# Patient Record
Sex: FEMALE | Race: WHITE | Employment: FULL TIME | URBAN - METROPOLITAN AREA
[De-identification: names, ages, dates, MRNs, and addresses within clinical notes are randomized per-mention and may not be internally consistent; named-entity substitution may affect disease eponyms.]

---

## 2018-06-18 LAB
ABO/RH: NORMAL
ANTIBODY SCREEN: NEGATIVE
HEPATITIS B SURFACE ANTIGEN INTERPRETATION: NEGATIVE
HIV-1 AND HIV-2 ANTIBODIES: NEGATIVE
RPR: NORMAL
RUBELLA ANTIBODY IGG: NORMAL

## 2019-01-30 ENCOUNTER — ANESTHESIA (OUTPATIENT)
Dept: LABOR AND DELIVERY | Age: 34
End: 2019-01-30
Payer: COMMERCIAL

## 2019-01-30 ENCOUNTER — HOSPITAL ENCOUNTER (INPATIENT)
Age: 34
LOS: 2 days | Discharge: HOME | End: 2019-02-01
Attending: OBSTETRICS & GYNECOLOGY | Admitting: OBSTETRICS & GYNECOLOGY
Payer: COMMERCIAL

## 2019-01-30 ENCOUNTER — ANESTHESIA EVENT (OUTPATIENT)
Dept: LABOR AND DELIVERY | Age: 34
End: 2019-01-30
Payer: COMMERCIAL

## 2019-01-30 PROBLEM — Z37.9 NORMAL LABOR: Status: ACTIVE | Noted: 2019-01-30

## 2019-01-30 LAB
ABO/RH: NORMAL
AMPHETAMINE SCREEN, URINE: NORMAL
ANTIBODY SCREEN: NORMAL
BARBITURATE SCREEN URINE: NORMAL
BENZODIAZEPINE SCREEN, URINE: NORMAL
BUPRENORPHINE URINE: NORMAL
CANNABINOID SCREEN URINE: NORMAL
COCAINE METABOLITE SCREEN URINE: NORMAL
HCT VFR BLD CALC: 42.2 % (ref 36–48)
HEMOGLOBIN: 14.4 G/DL (ref 12–16)
Lab: NORMAL
MCH RBC QN AUTO: 32.3 PG (ref 26–34)
MCHC RBC AUTO-ENTMCNC: 34 G/DL (ref 31–36)
MCV RBC AUTO: 94.9 FL (ref 80–100)
METHADONE SCREEN, URINE: NORMAL
OPIATE SCREEN URINE: NORMAL
OXYCODONE URINE: NORMAL
PDW BLD-RTO: 12.8 % (ref 12.4–15.4)
PH UA: 6
PHENCYCLIDINE SCREEN URINE: NORMAL
PLATELET # BLD: 218 K/UL (ref 135–450)
PMV BLD AUTO: 8.2 FL (ref 5–10.5)
PROPOXYPHENE SCREEN: NORMAL
RBC # BLD: 4.45 M/UL (ref 4–5.2)
TOTAL SYPHILLIS IGG/IGM: NORMAL
WBC # BLD: 13.6 K/UL (ref 4–11)

## 2019-01-30 PROCEDURE — 86900 BLOOD TYPING SEROLOGIC ABO: CPT

## 2019-01-30 PROCEDURE — 51701 INSERT BLADDER CATHETER: CPT

## 2019-01-30 PROCEDURE — 6360000002 HC RX W HCPCS: Performed by: OBSTETRICS & GYNECOLOGY

## 2019-01-30 PROCEDURE — 1220000000 HC SEMI PRIVATE OB R&B

## 2019-01-30 PROCEDURE — 2500000003 HC RX 250 WO HCPCS: Performed by: NURSE ANESTHETIST, CERTIFIED REGISTERED

## 2019-01-30 PROCEDURE — 2580000003 HC RX 258: Performed by: OBSTETRICS & GYNECOLOGY

## 2019-01-30 PROCEDURE — 85027 COMPLETE CBC AUTOMATED: CPT

## 2019-01-30 PROCEDURE — 86901 BLOOD TYPING SEROLOGIC RH(D): CPT

## 2019-01-30 PROCEDURE — 80307 DRUG TEST PRSMV CHEM ANLYZR: CPT

## 2019-01-30 PROCEDURE — 86780 TREPONEMA PALLIDUM: CPT

## 2019-01-30 PROCEDURE — 3700000025 ANESTHESIA EPIDURAL BLOCK: Performed by: ANESTHESIOLOGY

## 2019-01-30 PROCEDURE — 86850 RBC ANTIBODY SCREEN: CPT

## 2019-01-30 RX ORDER — BUPIVACAINE HYDROCHLORIDE 2.5 MG/ML
INJECTION, SOLUTION EPIDURAL; INFILTRATION; INTRACAUDAL PRN
Status: DISCONTINUED | OUTPATIENT
Start: 2019-01-30 | End: 2019-01-30 | Stop reason: SDUPTHER

## 2019-01-30 RX ORDER — SODIUM CHLORIDE, SODIUM LACTATE, POTASSIUM CHLORIDE, CALCIUM CHLORIDE 600; 310; 30; 20 MG/100ML; MG/100ML; MG/100ML; MG/100ML
INJECTION, SOLUTION INTRAVENOUS CONTINUOUS
Status: DISCONTINUED | OUTPATIENT
Start: 2019-01-30 | End: 2019-01-31 | Stop reason: SDUPTHER

## 2019-01-30 RX ORDER — BUPIVACAINE HYDROCHLORIDE 5 MG/ML
INJECTION, SOLUTION EPIDURAL; INTRACAUDAL PRN
Status: DISCONTINUED | OUTPATIENT
Start: 2019-01-30 | End: 2019-01-30 | Stop reason: SDUPTHER

## 2019-01-30 RX ORDER — SODIUM CHLORIDE 0.9 % (FLUSH) 0.9 %
10 SYRINGE (ML) INJECTION EVERY 12 HOURS SCHEDULED
Status: DISCONTINUED | OUTPATIENT
Start: 2019-01-30 | End: 2019-01-31 | Stop reason: SDUPTHER

## 2019-01-30 RX ORDER — DIPHENHYDRAMINE HCL 25 MG
25 TABLET ORAL EVERY 4 HOURS PRN
Status: DISCONTINUED | OUTPATIENT
Start: 2019-01-30 | End: 2019-01-31 | Stop reason: SDUPTHER

## 2019-01-30 RX ORDER — ACETAMINOPHEN 325 MG/1
650 TABLET ORAL EVERY 4 HOURS PRN
Status: DISCONTINUED | OUTPATIENT
Start: 2019-01-30 | End: 2019-02-01 | Stop reason: HOSPADM

## 2019-01-30 RX ORDER — LIDOCAINE HYDROCHLORIDE AND EPINEPHRINE BITARTRATE 20; .01 MG/ML; MG/ML
INJECTION, SOLUTION SUBCUTANEOUS PRN
Status: DISCONTINUED | OUTPATIENT
Start: 2019-01-30 | End: 2019-01-30 | Stop reason: SDUPTHER

## 2019-01-30 RX ORDER — TERBUTALINE SULFATE 1 MG/ML
0.25 INJECTION, SOLUTION SUBCUTANEOUS ONCE
Status: DISCONTINUED | OUTPATIENT
Start: 2019-01-30 | End: 2019-02-01 | Stop reason: HOSPADM

## 2019-01-30 RX ORDER — ONDANSETRON 2 MG/ML
4 INJECTION INTRAMUSCULAR; INTRAVENOUS EVERY 6 HOURS PRN
Status: DISCONTINUED | OUTPATIENT
Start: 2019-01-30 | End: 2019-01-31 | Stop reason: SDUPTHER

## 2019-01-30 RX ORDER — DIPHENHYDRAMINE HYDROCHLORIDE 50 MG/ML
12.5 INJECTION INTRAMUSCULAR; INTRAVENOUS EVERY 6 HOURS PRN
Status: DISCONTINUED | OUTPATIENT
Start: 2019-01-30 | End: 2019-02-01 | Stop reason: HOSPADM

## 2019-01-30 RX ORDER — ONDANSETRON 2 MG/ML
4 INJECTION INTRAMUSCULAR; INTRAVENOUS EVERY 6 HOURS PRN
Status: DISCONTINUED | OUTPATIENT
Start: 2019-01-30 | End: 2019-02-01 | Stop reason: HOSPADM

## 2019-01-30 RX ORDER — SODIUM CHLORIDE 0.9 % (FLUSH) 0.9 %
10 SYRINGE (ML) INJECTION PRN
Status: DISCONTINUED | OUTPATIENT
Start: 2019-01-30 | End: 2019-01-31 | Stop reason: SDUPTHER

## 2019-01-30 RX ORDER — ACETAMINOPHEN 325 MG/1
650 TABLET ORAL EVERY 4 HOURS PRN
Status: DISCONTINUED | OUTPATIENT
Start: 2019-01-30 | End: 2019-01-31 | Stop reason: SDUPTHER

## 2019-01-30 RX ORDER — SODIUM CHLORIDE 0.9 % (FLUSH) 0.9 %
10 SYRINGE (ML) INJECTION PRN
Status: DISCONTINUED | OUTPATIENT
Start: 2019-01-30 | End: 2019-02-01 | Stop reason: HOSPADM

## 2019-01-30 RX ADMIN — ONDANSETRON 4 MG: 2 INJECTION INTRAMUSCULAR; INTRAVENOUS at 16:48

## 2019-01-30 RX ADMIN — Medication 15 ML/HR: at 06:20

## 2019-01-30 RX ADMIN — Medication 1 MILLI-UNITS/MIN: at 08:30

## 2019-01-30 RX ADMIN — SODIUM CHLORIDE, POTASSIUM CHLORIDE, SODIUM LACTATE AND CALCIUM CHLORIDE: 600; 310; 30; 20 INJECTION, SOLUTION INTRAVENOUS at 06:00

## 2019-01-30 RX ADMIN — LIDOCAINE HYDROCHLORIDE AND EPINEPHRINE 5 ML: 20; 10 INJECTION, SOLUTION INFILTRATION; PERINEURAL at 22:55

## 2019-01-30 RX ADMIN — BUPIVACAINE HYDROCHLORIDE 2.5 ML: 5 INJECTION, SOLUTION EPIDURAL; INTRACAUDAL; PERINEURAL at 06:13

## 2019-01-30 RX ADMIN — LIDOCAINE HYDROCHLORIDE AND EPINEPHRINE 5 ML: 20; 10 INJECTION, SOLUTION INFILTRATION; PERINEURAL at 21:21

## 2019-01-30 RX ADMIN — SODIUM CHLORIDE, POTASSIUM CHLORIDE, SODIUM LACTATE AND CALCIUM CHLORIDE: 600; 310; 30; 20 INJECTION, SOLUTION INTRAVENOUS at 15:19

## 2019-01-30 RX ADMIN — BUPIVACAINE HYDROCHLORIDE 2.5 ML: 2.5 INJECTION, SOLUTION EPIDURAL; INFILTRATION; INTRACAUDAL; PERINEURAL at 06:13

## 2019-01-30 RX ADMIN — SODIUM CHLORIDE, POTASSIUM CHLORIDE, SODIUM LACTATE AND CALCIUM CHLORIDE: 600; 310; 30; 20 INJECTION, SOLUTION INTRAVENOUS at 05:20

## 2019-01-30 RX ADMIN — BUPIVACAINE HYDROCHLORIDE 5 ML: 5 INJECTION, SOLUTION EPIDURAL; INTRACAUDAL; PERINEURAL at 22:55

## 2019-01-30 RX ADMIN — SODIUM CHLORIDE, POTASSIUM CHLORIDE, SODIUM LACTATE AND CALCIUM CHLORIDE: 600; 310; 30; 20 INJECTION, SOLUTION INTRAVENOUS at 09:29

## 2019-01-30 RX ADMIN — ONDANSETRON 4 MG: 2 INJECTION INTRAMUSCULAR; INTRAVENOUS at 10:43

## 2019-01-30 RX ADMIN — BUPIVACAINE HYDROCHLORIDE 5 ML: 5 INJECTION, SOLUTION EPIDURAL; INTRACAUDAL; PERINEURAL at 21:21

## 2019-01-30 ASSESSMENT — PAIN DESCRIPTION - DESCRIPTORS
DESCRIPTORS: CRAMPING;DULL
DESCRIPTORS: DISCOMFORT;CRAMPING;BURNING;SORE
DESCRIPTORS: SHOOTING;SHARP;CRAMPING;ACHING
DESCRIPTORS: DULL;CRAMPING
DESCRIPTORS: CRAMPING
DESCRIPTORS: DULL;DISCOMFORT
DESCRIPTORS: ACHING;CRAMPING;SHARP
DESCRIPTORS: DULL;DISCOMFORT
DESCRIPTORS: CRAMPING;DISCOMFORT;SORE
DESCRIPTORS: ACHING
DESCRIPTORS: DULL;DISCOMFORT;ACHING

## 2019-01-31 PROCEDURE — 6360000002 HC RX W HCPCS

## 2019-01-31 PROCEDURE — 6370000000 HC RX 637 (ALT 250 FOR IP): Performed by: OBSTETRICS & GYNECOLOGY

## 2019-01-31 PROCEDURE — 6360000002 HC RX W HCPCS: Performed by: OBSTETRICS & GYNECOLOGY

## 2019-01-31 PROCEDURE — 7200000001 HC VAGINAL DELIVERY

## 2019-01-31 PROCEDURE — 1220000000 HC SEMI PRIVATE OB R&B

## 2019-01-31 RX ORDER — SODIUM CHLORIDE, SODIUM LACTATE, POTASSIUM CHLORIDE, CALCIUM CHLORIDE 600; 310; 30; 20 MG/100ML; MG/100ML; MG/100ML; MG/100ML
INJECTION, SOLUTION INTRAVENOUS CONTINUOUS
Status: DISCONTINUED | OUTPATIENT
Start: 2019-01-31 | End: 2019-02-01 | Stop reason: HOSPADM

## 2019-01-31 RX ORDER — HYDROCODONE BITARTRATE AND ACETAMINOPHEN 5; 325 MG/1; MG/1
2 TABLET ORAL EVERY 4 HOURS PRN
Status: DISCONTINUED | OUTPATIENT
Start: 2019-01-31 | End: 2019-02-01 | Stop reason: HOSPADM

## 2019-01-31 RX ORDER — IBUPROFEN 800 MG/1
800 TABLET ORAL EVERY 6 HOURS PRN
Status: DISCONTINUED | OUTPATIENT
Start: 2019-01-31 | End: 2019-02-01 | Stop reason: HOSPADM

## 2019-01-31 RX ORDER — HYDROCODONE BITARTRATE AND ACETAMINOPHEN 5; 325 MG/1; MG/1
1 TABLET ORAL EVERY 4 HOURS PRN
Status: DISCONTINUED | OUTPATIENT
Start: 2019-01-31 | End: 2019-02-01 | Stop reason: HOSPADM

## 2019-01-31 RX ORDER — LANOLIN 100 %
OINTMENT (GRAM) TOPICAL PRN
Status: DISCONTINUED | OUTPATIENT
Start: 2019-01-31 | End: 2019-02-01 | Stop reason: HOSPADM

## 2019-01-31 RX ORDER — DOCUSATE SODIUM 100 MG/1
100 CAPSULE, LIQUID FILLED ORAL 2 TIMES DAILY PRN
Status: DISCONTINUED | OUTPATIENT
Start: 2019-01-31 | End: 2019-02-01 | Stop reason: HOSPADM

## 2019-01-31 RX ORDER — ONDANSETRON 2 MG/ML
4 INJECTION INTRAMUSCULAR; INTRAVENOUS EVERY 6 HOURS PRN
Status: DISCONTINUED | OUTPATIENT
Start: 2019-01-31 | End: 2019-01-31

## 2019-01-31 RX ORDER — KETOROLAC TROMETHAMINE 30 MG/ML
30 INJECTION, SOLUTION INTRAMUSCULAR; INTRAVENOUS EVERY 6 HOURS PRN
Status: DISPENSED | OUTPATIENT
Start: 2019-01-31 | End: 2019-01-31

## 2019-01-31 RX ORDER — KETOROLAC TROMETHAMINE 30 MG/ML
INJECTION, SOLUTION INTRAMUSCULAR; INTRAVENOUS
Status: COMPLETED
Start: 2019-01-31 | End: 2019-01-31

## 2019-01-31 RX ORDER — SIMETHICONE 80 MG
80 TABLET,CHEWABLE ORAL EVERY 6 HOURS PRN
Status: DISCONTINUED | OUTPATIENT
Start: 2019-01-31 | End: 2019-02-01 | Stop reason: HOSPADM

## 2019-01-31 RX ORDER — FERROUS SULFATE 325(65) MG
325 TABLET ORAL 2 TIMES DAILY WITH MEALS
Status: DISCONTINUED | OUTPATIENT
Start: 2019-01-31 | End: 2019-02-01 | Stop reason: HOSPADM

## 2019-01-31 RX ORDER — ACETAMINOPHEN 325 MG/1
650 TABLET ORAL EVERY 4 HOURS PRN
Status: DISCONTINUED | OUTPATIENT
Start: 2019-01-31 | End: 2019-01-31

## 2019-01-31 RX ORDER — SODIUM CHLORIDE 0.9 % (FLUSH) 0.9 %
10 SYRINGE (ML) INJECTION PRN
Status: DISCONTINUED | OUTPATIENT
Start: 2019-01-31 | End: 2019-01-31

## 2019-01-31 RX ORDER — SODIUM CHLORIDE 0.9 % (FLUSH) 0.9 %
SYRINGE (ML) INJECTION
Status: DISPENSED
Start: 2019-01-31 | End: 2019-01-31

## 2019-01-31 RX ORDER — SODIUM CHLORIDE 0.9 % (FLUSH) 0.9 %
10 SYRINGE (ML) INJECTION EVERY 12 HOURS SCHEDULED
Status: DISCONTINUED | OUTPATIENT
Start: 2019-01-31 | End: 2019-02-01 | Stop reason: HOSPADM

## 2019-01-31 RX ADMIN — KETOROLAC TROMETHAMINE 30 MG: 30 INJECTION, SOLUTION INTRAMUSCULAR at 05:42

## 2019-01-31 RX ADMIN — DOCUSATE SODIUM 100 MG: 100 CAPSULE, LIQUID FILLED ORAL at 10:51

## 2019-01-31 RX ADMIN — DOCUSATE SODIUM 100 MG: 100 CAPSULE, LIQUID FILLED ORAL at 21:26

## 2019-01-31 RX ADMIN — HYDROCODONE BITARTRATE AND ACETAMINOPHEN 1 TABLET: 5; 325 TABLET ORAL at 18:25

## 2019-01-31 RX ADMIN — HYDROCODONE BITARTRATE AND ACETAMINOPHEN 1 TABLET: 5; 325 TABLET ORAL at 13:56

## 2019-01-31 RX ADMIN — KETOROLAC TROMETHAMINE: 30 INJECTION, SOLUTION INTRAMUSCULAR at 00:43

## 2019-01-31 RX ADMIN — IBUPROFEN 800 MG: 800 TABLET, FILM COATED ORAL at 10:52

## 2019-01-31 RX ADMIN — IBUPROFEN 800 MG: 800 TABLET, FILM COATED ORAL at 18:25

## 2019-01-31 ASSESSMENT — PAIN SCALES - GENERAL
PAINLEVEL_OUTOF10: 2
PAINLEVEL_OUTOF10: 3
PAINLEVEL_OUTOF10: 6
PAINLEVEL_OUTOF10: 3
PAINLEVEL_OUTOF10: 6

## 2019-02-01 VITALS
RESPIRATION RATE: 18 BRPM | SYSTOLIC BLOOD PRESSURE: 108 MMHG | BODY MASS INDEX: 29.44 KG/M2 | HEIGHT: 62 IN | TEMPERATURE: 98.2 F | OXYGEN SATURATION: 99 % | HEART RATE: 69 BPM | WEIGHT: 160 LBS | DIASTOLIC BLOOD PRESSURE: 70 MMHG

## 2019-02-01 LAB
HCT VFR BLD CALC: 33.7 % (ref 36–48)
HEMOGLOBIN: 11.2 G/DL (ref 12–16)
MCH RBC QN AUTO: 32.1 PG (ref 26–34)
MCHC RBC AUTO-ENTMCNC: 33.2 G/DL (ref 31–36)
MCV RBC AUTO: 96.8 FL (ref 80–100)
PDW BLD-RTO: 12.9 % (ref 12.4–15.4)
PLATELET # BLD: 190 K/UL (ref 135–450)
PMV BLD AUTO: 7.8 FL (ref 5–10.5)
RBC # BLD: 3.48 M/UL (ref 4–5.2)
WBC # BLD: 12.9 K/UL (ref 4–11)

## 2019-02-01 PROCEDURE — 36415 COLL VENOUS BLD VENIPUNCTURE: CPT

## 2019-02-01 PROCEDURE — 85027 COMPLETE CBC AUTOMATED: CPT

## 2019-02-01 PROCEDURE — 6370000000 HC RX 637 (ALT 250 FOR IP): Performed by: OBSTETRICS & GYNECOLOGY

## 2019-02-01 RX ORDER — HYDROCODONE BITARTRATE AND ACETAMINOPHEN 5; 325 MG/1; MG/1
1 TABLET ORAL EVERY 4 HOURS PRN
Qty: 6 TABLET | Refills: 0 | Status: SHIPPED | OUTPATIENT
Start: 2019-02-01 | End: 2019-02-04

## 2019-02-01 RX ORDER — IBUPROFEN 800 MG/1
800 TABLET ORAL EVERY 8 HOURS PRN
Qty: 25 TABLET | Refills: 1 | Status: ON HOLD | OUTPATIENT
Start: 2019-02-01 | End: 2022-01-05 | Stop reason: HOSPADM

## 2019-02-01 RX ADMIN — IBUPROFEN 800 MG: 800 TABLET, FILM COATED ORAL at 03:02

## 2019-02-01 RX ADMIN — DOCUSATE SODIUM 100 MG: 100 CAPSULE, LIQUID FILLED ORAL at 09:40

## 2019-02-01 RX ADMIN — HYDROCODONE BITARTRATE AND ACETAMINOPHEN 1 TABLET: 5; 325 TABLET ORAL at 03:02

## 2019-02-01 RX ADMIN — HYDROCODONE BITARTRATE AND ACETAMINOPHEN 1 TABLET: 5; 325 TABLET ORAL at 07:31

## 2019-02-01 RX ADMIN — IBUPROFEN 800 MG: 800 TABLET, FILM COATED ORAL at 09:40

## 2019-02-01 ASSESSMENT — PAIN SCALES - GENERAL
PAINLEVEL_OUTOF10: 3
PAINLEVEL_OUTOF10: 3
PAINLEVEL_OUTOF10: 2

## 2021-05-17 LAB
C. TRACHOMATIS, EXTERNAL RESULT: NEGATIVE
N. GONORRHOEAE, EXTERNAL RESULT: NEGATIVE

## 2021-06-02 LAB
ABO, EXTERNAL RESULT: NORMAL
HEP B, EXTERNAL RESULT: NEGATIVE
HIV, EXTERNAL RESULT: NONREACTIVE
RH FACTOR, EXTERNAL RESULT: POSITIVE
RPR, EXTERNAL RESULT: NONREACTIVE
RUBELLA TITER, EXTERNAL RESULT: NORMAL

## 2021-12-17 LAB — GBS, EXTERNAL RESULT: NEGATIVE

## 2021-12-30 ENCOUNTER — HOSPITAL ENCOUNTER (OUTPATIENT)
Age: 36
Discharge: HOME | End: 2022-01-03
Payer: COMMERCIAL

## 2021-12-30 PROCEDURE — U0005 INFEC AGEN DETEC AMPLI PROBE: HCPCS

## 2021-12-30 PROCEDURE — U0003 INFECTIOUS AGENT DETECTION BY NUCLEIC ACID (DNA OR RNA); SEVERE ACUTE RESPIRATORY SYNDROME CORONAVIRUS 2 (SARS-COV-2) (CORONAVIRUS DISEASE [COVID-19]), AMPLIFIED PROBE TECHNIQUE, MAKING USE OF HIGH THROUGHPUT TECHNOLOGIES AS DESCRIBED BY CMS-2020-01-R: HCPCS

## 2021-12-31 LAB — SARS-COV-2: NOT DETECTED

## 2022-01-03 ENCOUNTER — ANESTHESIA EVENT (OUTPATIENT)
Dept: LABOR AND DELIVERY | Age: 37
End: 2022-01-03
Payer: COMMERCIAL

## 2022-01-03 ENCOUNTER — ANESTHESIA (OUTPATIENT)
Dept: LABOR AND DELIVERY | Age: 37
End: 2022-01-03
Payer: COMMERCIAL

## 2022-01-03 ENCOUNTER — HOSPITAL ENCOUNTER (INPATIENT)
Age: 37
LOS: 2 days | Discharge: HOME | End: 2022-01-05
Attending: OBSTETRICS & GYNECOLOGY | Admitting: OBSTETRICS & GYNECOLOGY
Payer: COMMERCIAL

## 2022-01-03 LAB
ABO/RH: NORMAL
AMPHETAMINE SCREEN, URINE: NORMAL
ANTIBODY SCREEN: NORMAL
BARBITURATE SCREEN URINE: NORMAL
BASOPHILS ABSOLUTE: 0.1 K/UL (ref 0–0.2)
BASOPHILS RELATIVE PERCENT: 0.6 %
BENZODIAZEPINE SCREEN, URINE: NORMAL
BUPRENORPHINE URINE: NORMAL
CANNABINOID SCREEN URINE: NORMAL
COCAINE METABOLITE SCREEN URINE: NORMAL
EOSINOPHILS ABSOLUTE: 0 K/UL (ref 0–0.6)
EOSINOPHILS RELATIVE PERCENT: 0.4 %
HCT VFR BLD CALC: 39.1 % (ref 36–48)
HEMOGLOBIN: 13.7 G/DL (ref 12–16)
LYMPHOCYTES ABSOLUTE: 1.6 K/UL (ref 1–5.1)
LYMPHOCYTES RELATIVE PERCENT: 18.6 %
Lab: NORMAL
MCH RBC QN AUTO: 32.1 PG (ref 26–34)
MCHC RBC AUTO-ENTMCNC: 35 G/DL (ref 31–36)
MCV RBC AUTO: 91.8 FL (ref 80–100)
METHADONE SCREEN, URINE: NORMAL
MONOCYTES ABSOLUTE: 0.6 K/UL (ref 0–1.3)
MONOCYTES RELATIVE PERCENT: 7.4 %
NEUTROPHILS ABSOLUTE: 6.2 K/UL (ref 1.7–7.7)
NEUTROPHILS RELATIVE PERCENT: 73 %
OPIATE SCREEN URINE: NORMAL
OXYCODONE URINE: NORMAL
PDW BLD-RTO: 13 % (ref 12.4–15.4)
PH UA: 7
PHENCYCLIDINE SCREEN URINE: NORMAL
PLATELET # BLD: 231 K/UL (ref 135–450)
PMV BLD AUTO: 7.6 FL (ref 5–10.5)
PROPOXYPHENE SCREEN: NORMAL
RBC # BLD: 4.26 M/UL (ref 4–5.2)
WBC # BLD: 8.5 K/UL (ref 4–11)

## 2022-01-03 PROCEDURE — 86900 BLOOD TYPING SEROLOGIC ABO: CPT

## 2022-01-03 PROCEDURE — 2500000003 HC RX 250 WO HCPCS: Performed by: NURSE ANESTHETIST, CERTIFIED REGISTERED

## 2022-01-03 PROCEDURE — 6360000002 HC RX W HCPCS: Performed by: OBSTETRICS & GYNECOLOGY

## 2022-01-03 PROCEDURE — 86850 RBC ANTIBODY SCREEN: CPT

## 2022-01-03 PROCEDURE — 51701 INSERT BLADDER CATHETER: CPT

## 2022-01-03 PROCEDURE — 80307 DRUG TEST PRSMV CHEM ANLYZR: CPT

## 2022-01-03 PROCEDURE — 1220000000 HC SEMI PRIVATE OB R&B

## 2022-01-03 PROCEDURE — 2580000003 HC RX 258: Performed by: OBSTETRICS & GYNECOLOGY

## 2022-01-03 PROCEDURE — 10907ZC DRAINAGE OF AMNIOTIC FLUID, THERAPEUTIC FROM PRODUCTS OF CONCEPTION, VIA NATURAL OR ARTIFICIAL OPENING: ICD-10-PCS | Performed by: OBSTETRICS & GYNECOLOGY

## 2022-01-03 PROCEDURE — 86780 TREPONEMA PALLIDUM: CPT

## 2022-01-03 PROCEDURE — 86901 BLOOD TYPING SEROLOGIC RH(D): CPT

## 2022-01-03 PROCEDURE — 7200000001 HC VAGINAL DELIVERY

## 2022-01-03 PROCEDURE — 3700000025 EPIDURAL BLOCK: Performed by: ANESTHESIOLOGY

## 2022-01-03 PROCEDURE — 85025 COMPLETE CBC W/AUTO DIFF WBC: CPT

## 2022-01-03 PROCEDURE — 0KQM0ZZ REPAIR PERINEUM MUSCLE, OPEN APPROACH: ICD-10-PCS | Performed by: OBSTETRICS & GYNECOLOGY

## 2022-01-03 PROCEDURE — 6370000000 HC RX 637 (ALT 250 FOR IP): Performed by: OBSTETRICS & GYNECOLOGY

## 2022-01-03 RX ORDER — SODIUM CHLORIDE 9 MG/ML
25 INJECTION, SOLUTION INTRAVENOUS PRN
Status: DISCONTINUED | OUTPATIENT
Start: 2022-01-03 | End: 2022-01-05 | Stop reason: HOSPADM

## 2022-01-03 RX ORDER — FERROUS SULFATE 325(65) MG
325 TABLET ORAL 2 TIMES DAILY WITH MEALS
Status: DISCONTINUED | OUTPATIENT
Start: 2022-01-03 | End: 2022-01-05 | Stop reason: HOSPADM

## 2022-01-03 RX ORDER — SODIUM CHLORIDE, SODIUM LACTATE, POTASSIUM CHLORIDE, AND CALCIUM CHLORIDE .6; .31; .03; .02 G/100ML; G/100ML; G/100ML; G/100ML
500 INJECTION, SOLUTION INTRAVENOUS PRN
Status: DISCONTINUED | OUTPATIENT
Start: 2022-01-03 | End: 2022-01-03

## 2022-01-03 RX ORDER — SIMETHICONE 80 MG
80 TABLET,CHEWABLE ORAL EVERY 6 HOURS PRN
Status: DISCONTINUED | OUTPATIENT
Start: 2022-01-03 | End: 2022-01-05 | Stop reason: HOSPADM

## 2022-01-03 RX ORDER — HYDROCODONE BITARTRATE AND ACETAMINOPHEN 5; 325 MG/1; MG/1
1 TABLET ORAL EVERY 4 HOURS PRN
Status: DISCONTINUED | OUTPATIENT
Start: 2022-01-03 | End: 2022-01-05 | Stop reason: HOSPADM

## 2022-01-03 RX ORDER — SODIUM CHLORIDE 0.9 % (FLUSH) 0.9 %
5-40 SYRINGE (ML) INJECTION PRN
Status: DISCONTINUED | OUTPATIENT
Start: 2022-01-03 | End: 2022-01-05 | Stop reason: HOSPADM

## 2022-01-03 RX ORDER — DIPHENHYDRAMINE HCL 25 MG
25 TABLET ORAL EVERY 4 HOURS PRN
Status: DISCONTINUED | OUTPATIENT
Start: 2022-01-03 | End: 2022-01-05 | Stop reason: HOSPADM

## 2022-01-03 RX ORDER — ACETAMINOPHEN 325 MG/1
650 TABLET ORAL EVERY 4 HOURS PRN
Status: DISCONTINUED | OUTPATIENT
Start: 2022-01-03 | End: 2022-01-05 | Stop reason: HOSPADM

## 2022-01-03 RX ORDER — SODIUM CHLORIDE, SODIUM LACTATE, POTASSIUM CHLORIDE, CALCIUM CHLORIDE 600; 310; 30; 20 MG/100ML; MG/100ML; MG/100ML; MG/100ML
INJECTION, SOLUTION INTRAVENOUS CONTINUOUS
Status: DISCONTINUED | OUTPATIENT
Start: 2022-01-03 | End: 2022-01-05 | Stop reason: HOSPADM

## 2022-01-03 RX ORDER — LANOLIN 100 %
OINTMENT (GRAM) TOPICAL PRN
Status: DISCONTINUED | OUTPATIENT
Start: 2022-01-03 | End: 2022-01-05 | Stop reason: HOSPADM

## 2022-01-03 RX ORDER — HYDROCODONE BITARTRATE AND ACETAMINOPHEN 5; 325 MG/1; MG/1
2 TABLET ORAL EVERY 4 HOURS PRN
Status: DISCONTINUED | OUTPATIENT
Start: 2022-01-03 | End: 2022-01-05 | Stop reason: HOSPADM

## 2022-01-03 RX ORDER — ONDANSETRON 2 MG/ML
4 INJECTION INTRAMUSCULAR; INTRAVENOUS EVERY 6 HOURS PRN
Status: DISCONTINUED | OUTPATIENT
Start: 2022-01-03 | End: 2022-01-05 | Stop reason: HOSPADM

## 2022-01-03 RX ORDER — DOCUSATE SODIUM 100 MG/1
100 CAPSULE, LIQUID FILLED ORAL 2 TIMES DAILY PRN
Status: DISCONTINUED | OUTPATIENT
Start: 2022-01-03 | End: 2022-01-05 | Stop reason: HOSPADM

## 2022-01-03 RX ORDER — FAMOTIDINE 20 MG/1
20 TABLET, FILM COATED ORAL 2 TIMES DAILY
Status: DISCONTINUED | OUTPATIENT
Start: 2022-01-03 | End: 2022-01-05 | Stop reason: HOSPADM

## 2022-01-03 RX ORDER — SODIUM CHLORIDE, SODIUM LACTATE, POTASSIUM CHLORIDE, AND CALCIUM CHLORIDE .6; .31; .03; .02 G/100ML; G/100ML; G/100ML; G/100ML
1000 INJECTION, SOLUTION INTRAVENOUS PRN
Status: DISCONTINUED | OUTPATIENT
Start: 2022-01-03 | End: 2022-01-03

## 2022-01-03 RX ORDER — IBUPROFEN 800 MG/1
800 TABLET ORAL EVERY 6 HOURS PRN
Status: DISCONTINUED | OUTPATIENT
Start: 2022-01-03 | End: 2022-01-05 | Stop reason: HOSPADM

## 2022-01-03 RX ORDER — SODIUM CHLORIDE 0.9 % (FLUSH) 0.9 %
5-40 SYRINGE (ML) INJECTION EVERY 12 HOURS SCHEDULED
Status: DISCONTINUED | OUTPATIENT
Start: 2022-01-03 | End: 2022-01-05 | Stop reason: HOSPADM

## 2022-01-03 RX ADMIN — Medication 15 ML/HR: at 11:15

## 2022-01-03 RX ADMIN — DOCUSATE SODIUM 100 MG: 100 CAPSULE ORAL at 20:18

## 2022-01-03 RX ADMIN — Medication 1 MILLI-UNITS/MIN: at 14:31

## 2022-01-03 RX ADMIN — IBUPROFEN 800 MG: 800 TABLET, FILM COATED ORAL at 19:41

## 2022-01-03 RX ADMIN — WITCH HAZEL 40 EACH: 500 SOLUTION RECTAL; TOPICAL at 20:18

## 2022-01-03 RX ADMIN — SODIUM CHLORIDE, POTASSIUM CHLORIDE, SODIUM LACTATE AND CALCIUM CHLORIDE: 600; 310; 30; 20 INJECTION, SOLUTION INTRAVENOUS at 11:05

## 2022-01-03 RX ADMIN — HYDROCODONE BITARTRATE AND ACETAMINOPHEN 2 TABLET: 5; 325 TABLET ORAL at 20:18

## 2022-01-03 RX ADMIN — Medication 87.3 MILLI-UNITS/MIN: at 17:41

## 2022-01-03 RX ADMIN — BENZOCAINE AND LEVOMENTHOL: 200; 5 SPRAY TOPICAL at 20:18

## 2022-01-03 RX ADMIN — SODIUM CHLORIDE, POTASSIUM CHLORIDE, SODIUM LACTATE AND CALCIUM CHLORIDE: 600; 310; 30; 20 INJECTION, SOLUTION INTRAVENOUS at 09:45

## 2022-01-03 ASSESSMENT — PAIN SCALES - GENERAL
PAINLEVEL_OUTOF10: 8
PAINLEVEL_OUTOF10: 8

## 2022-01-03 NOTE — PROGRESS NOTES
Pt actively pushing. RN remains in continuous attendance at the bedside assessing fetal heart rate per EFM.

## 2022-01-03 NOTE — PROGRESS NOTES
Pt reports feeling infant move today. Pt denies any leaking of fluid or vaginal bleeding. Pt reports contractions starting yesterday around 0700 but becoming more uncomfortable this morning.

## 2022-01-03 NOTE — PROGRESS NOTES
DELIVERY NOTE     of viable male over intact perineum  Apgars 8 & 9, weight pending  Placenta spont, 3 VC, intact  2nd degree perineal and 1st degree right labial lacerations repaired with 3-0 vicryl under epidural   mL  Breastfeeding    Dictated #[id number]    CATHERINE Deal MD

## 2022-01-03 NOTE — PROGRESS NOTES
Pt comfortable after epidural  Requests AROM  Cvx 4/50/-2  FHR tracing Cat 1  Ctxns q 4-5 minutes  AROM for clear fluid    PLAN:  Continue labor    X.  Inocencio Levine MD

## 2022-01-03 NOTE — H&P
Department of Obstetrics and Gynecology   Obstetrics History and Physical        CHIEF COMPLAINT:  contractions    HISTORY OF PRESENT ILLNESS:      The patient is a 39 y.o. female at 44w2d. OB History        2    Para   1    Term   1            AB        Living   1       SAB        IAB        Ectopic        Molar        Multiple   0    Live Births   1            Patient presents with a chief complaint as above and is being admitted for active phase labor    Estimated Due Date: Estimated Date of Delivery: 22    PRENATAL CARE:    Complicated by: AMA-declined all testing. PAST OB HISTORY  OB History        2    Para   1    Term   1            AB        Living   1       SAB        IAB        Ectopic        Molar        Multiple   0    Live Births   1                Past Medical History:        Diagnosis Date    ADHD (attention deficit hyperactivity disorder)     Anxiety      Past Surgical History:        Procedure Laterality Date    MOUTH SURGERY      WISDOM TOOTH EXTRACTION       Allergies:  Patient has no known allergies.   Social History:    Social History     Socioeconomic History    Marital status: Single     Spouse name: Not on file    Number of children: Not on file    Years of education: Not on file    Highest education level: Not on file   Occupational History    Not on file   Tobacco Use    Smoking status: Former Smoker     Packs/day: 0.25    Smokeless tobacco: Never Used   Vaping Use    Vaping Use: Never used   Substance and Sexual Activity    Alcohol use: Not Currently     Comment: occas    Drug use: Never    Sexual activity: Yes     Partners: Male   Other Topics Concern    Not on file   Social History Narrative    Not on file     Social Determinants of Health     Financial Resource Strain:     Difficulty of Paying Living Expenses: Not on file   Food Insecurity:     Worried About Running Out of Food in the Last Year: Not on file    Nagi mar[employer] in the Last Year: Not on file   Transportation Needs:     Lack of Transportation (Medical): Not on file    Lack of Transportation (Non-Medical): Not on file   Physical Activity:     Days of Exercise per Week: Not on file    Minutes of Exercise per Session: Not on file   Stress:     Feeling of Stress : Not on file   Social Connections:     Frequency of Communication with Friends and Family: Not on file    Frequency of Social Gatherings with Friends and Family: Not on file    Attends [employer] Services: Not on file    Active Member of [address] [place] [address] [employer] or Organizations: Not on file    Attends Club or Organization Meetings: Not on file    Marital Status: Not on file   Intimate Partner Violence:     Fear of Current or Ex-Partner: Not on file    Emotionally Abused: Not on file    Physically Abused: Not on file    Sexually Abused: Not on file   Housing Stability:     Unable to Pay for Housing in the Last Year: Not on file    Number of [place] in the Last Year: Not on file    Unstable Housing in the Last Year: Not on file     Family History:   History reviewed. No pertinent family history. Medications Prior to Admission:  Medications Prior to Admission: diphenhydrAMINE (BENYLIN) 12.5 MG/5ML liquid, Take by mouth 4 times daily as needed for Allergies  Prenatal MV-Min-Fe Fum-FA-DHA (PRENATAL 1 PO),   ibuprofen (ADVIL;MOTRIN) 800 MG tablet, Take 1 tablet by mouth every 8 hours as needed (Pain level 1 - 10)  [DISCONTINUED] amphetamine-dextroamphetamine (ADDERALL, 20MG,) 20 MG tablet, Take 20 mg by mouth 3 times daily. [DISCONTINUED] escitalopram (LEXAPRO) 20 MG tablet, Take 20 mg by mouth 2 times daily. therapeutic multivitamin-minerals (THERAGRAN-M) tablet, Take 1 tablet by mouth daily.       REVIEW OF SYSTEMS:    CONSTITUTIONAL:  negative  RESPIRATORY:  negative  CARDIOVASCULAR:  negative  GASTROINTESTINAL:  negative  ALLERGIC/IMMUNOLOGIC:  negative  NEUROLOGICAL:  negative  BEHAVIOR/PSYCH: negative    PHYSICAL EXAM:  Blood pressure 124/78, pulse 92, temperature 98.3 °F (36.8 °C), temperature source Oral, resp. rate 16, height 5' 3\" (1.6 m), weight 160 lb (72.6 kg), unknown if currently breastfeeding. General appearance:  awake, alert, cooperative, no apparent distress, and appears stated age  Neurologic:  Awake, alert, oriented to name, place and time. Lungs:  No increased work of breathing, good air exchange  Abdomen:  Soft, non tender, gravid, consistent with her gestational age, EFW by Leopald's manouever was 7.5#   Fetal heart rate:  Reassuring. Pelvis:  Adequate pelvis  Cervix: 4 cm 70% soft -2 per office exam  Contraction frequency:  4-5 minutes    Membranes:  Intact    ASSESSMENT AND PLAN:    Labor: Admit, anticipate normal delivery, routine labor orders  Fetus: Reassuring  GBS: No  Other: epidural, AROM when pt agrees.     Doreen Carcamo MD 1/3/2022 11:05 AM

## 2022-01-03 NOTE — ANESTHESIA PROCEDURE NOTES
Epidural Block    Patient location during procedure: OB  Start time: 1/3/2022 10:48 AM  End time: 1/3/2022 11:15 AM  Reason for block: labor epidural  Staffing  Performed: resident/CRNA   Resident/CRNA: STALIN Liao - CRNA  Preanesthetic Checklist  Completed: patient identified, IV checked, site marked, risks and benefits discussed, surgical consent, monitors and equipment checked, pre-op evaluation, timeout performed, anesthesia consent given, oxygen available and patient being monitored  Epidural  Patient position: sitting  Prep: ChloraPrep  Patient monitoring: continuous pulse ox and frequent blood pressure checks  Approach: midline  Location: lumbar (1-5) (L3-4)  Injection technique: ZACARIAS saline  Provider prep: mask and sterile gloves  Needle  Needle type: Tuohy   Needle gauge: 17 G  Needle length: 3.5 in  Catheter type: side hole  Catheter size: 19 G (20 G)  Catheter at skin depth: 10 cm  Test dose: negative  Assessment  Sensory level: T8  Hemodynamics: stable  Attempts: 1  Additional Notes  Sitting, Sterile prep/drape, 1%Xylo at L3-4, 17ga Tuohy with ZCAARIAS, 25ga Pencan for w/+CSF for DPE, Pencan removed, Catheter inserted, negative test dose, sterile dressing applied.

## 2022-01-03 NOTE — ANESTHESIA PRE PROCEDURE
Department of Anesthesiology  Preprocedure Note       Name:  Karina Chao   Age:  39 y.o.  :  1985                                          MRN:  [id number]         Date:  1/3/2022      Surgeon: * No surgeons listed *    Procedure: * No procedures listed *    Medications prior to admission:   Prior to Admission medications    Medication Sig Start Date End Date Taking? Authorizing Provider   diphenhydrAMINE (BENYLIN) 12.5 MG/5ML liquid Take by mouth 4 times daily as needed for Allergies   Yes Historical Provider, MD   Prenatal MV-Min-Fe Fum-FA-DHA (PRENATAL 1 PO)    Yes Historical Provider, MD   ibuprofen (ADVIL;MOTRIN) 800 MG tablet Take 1 tablet by mouth every 8 hours as needed (Pain level 1 - 10) 19   Jose C Izaguirre MD   therapeutic multivitamin-minerals [facility]) tablet Take 1 tablet by mouth daily.       Historical Provider, MD       Current medications:    Current Facility-Administered Medications   Medication Dose Route Frequency Provider Last Rate Last Admin    lactated ringers infusion   IntraVENous Continuous Baldev Puri  mL/hr at 22 1105 New Bag at 22 1105     Facility-Administered Medications Ordered in Other Encounters   Medication Dose Route Frequency Provider Last Rate Last Admin    sodium chloride 0.9 % 200 mL with fentaNYL 500 mcg, bupivacaine 0.5% 50 mL (OB) epidural   Epidural Continuous PRN Carol STALIN Chacko - CRNA 15 mL/hr at 22 1115 15 mL/hr at 22 1115       Allergies:  No Known Allergies    Problem List:    Patient Active Problem List   Diagnosis Code    Normal labor O80, Z37.9    Vaginal delivery O80       Past Medical History:        Diagnosis Date    ADHD (attention deficit hyperactivity disorder)     Anxiety        Past Surgical History:        Procedure Laterality Date    MOUTH SURGERY      WISDOM TOOTH EXTRACTION         Social History:    Social History     Tobacco Use    Smoking status: Former Smoker Packs/day: 0.25    Smokeless tobacco: Never Used   Substance Use Topics    Alcohol use: Not Currently     Comment: occas                                Counseling given: Not Answered      Vital Signs (Current):   Vitals:    01/03/22 1110 01/03/22 1113 01/03/22 1116 01/03/22 1119   BP: 120/76 118/73 116/73 113/74   Pulse: 69 72 78 93   Resp: 16 16 16 16   Temp:       TempSrc:       SpO2:       Weight:       Height:                                                  BP Readings from Last 3 Encounters:   01/03/22 113/74   02/01/19 108/70   04/20/12 110/60       NPO Status:                                                                                 BMI:   Wt Readings from Last 3 Encounters:   01/03/22 160 lb (72.6 kg)   01/30/19 160 lb (72.6 kg)   04/20/12 109 lb 3.2 oz (49.5 kg)     Body mass index is 28.34 kg/m². CBC:   Lab Results   Component Value Date    WBC 8.5 01/03/2022    RBC 4.26 01/03/2022    HGB 13.7 01/03/2022    HCT 39.1 01/03/2022    MCV 91.8 01/03/2022    RDW 13.0 01/03/2022     01/03/2022       CMP:   Lab Results   Component Value Date     05/04/2012    K 4.3 05/04/2012     05/04/2012    CO2 30 05/04/2012    BUN 7 05/04/2012    CREATININE 0.7 05/04/2012    GFRAA >60 05/04/2012    GLUCOSE 92 05/04/2012    PROT 7.5 05/04/2012    CALCIUM 9.8 05/04/2012    BILITOT 0.40 05/04/2012    ALKPHOS 56 05/04/2012    AST 17 05/04/2012    ALT 14 05/04/2012       POC Tests: No results for input(s): POCGLU, POCNA, POCK, POCCL, POCBUN, POCHEMO, POCHCT in the last 72 hours.     Coags: No results found for: PROTIME, INR, APTT    HCG (If Applicable):   Lab Results   Component Value Date    PREGTESTUR neg 05/04/2012        ABGs: No results found for: PHART, PO2ART, IGT7BNT, BBV5MBR, BEART, J6BNUGML     Type & Screen (If Applicable):  No results found for: LABABO, LABRH    Drug/Infectious Status (If Applicable):  No results found for: HIV, HEPCAB    COVID-19 Screening (If Applicable):   Lab Results

## 2022-01-04 LAB
HCT VFR BLD CALC: 36.5 % (ref 36–48)
HEMOGLOBIN: 12.4 G/DL (ref 12–16)
MCH RBC QN AUTO: 31.6 PG (ref 26–34)
MCHC RBC AUTO-ENTMCNC: 34 G/DL (ref 31–36)
MCV RBC AUTO: 93.1 FL (ref 80–100)
PDW BLD-RTO: 13.2 % (ref 12.4–15.4)
PLATELET # BLD: 225 K/UL (ref 135–450)
PMV BLD AUTO: 8.1 FL (ref 5–10.5)
RBC # BLD: 3.91 M/UL (ref 4–5.2)
TOTAL SYPHILLIS IGG/IGM: NORMAL
WBC # BLD: 10.1 K/UL (ref 4–11)

## 2022-01-04 PROCEDURE — 6370000000 HC RX 637 (ALT 250 FOR IP): Performed by: OBSTETRICS & GYNECOLOGY

## 2022-01-04 PROCEDURE — 85027 COMPLETE CBC AUTOMATED: CPT

## 2022-01-04 PROCEDURE — 1220000000 HC SEMI PRIVATE OB R&B

## 2022-01-04 PROCEDURE — 36415 COLL VENOUS BLD VENIPUNCTURE: CPT

## 2022-01-04 RX ADMIN — HYDROCODONE BITARTRATE AND ACETAMINOPHEN 2 TABLET: 5; 325 TABLET ORAL at 00:33

## 2022-01-04 RX ADMIN — DOCUSATE SODIUM 100 MG: 100 CAPSULE ORAL at 09:41

## 2022-01-04 RX ADMIN — IBUPROFEN 800 MG: 800 TABLET, FILM COATED ORAL at 03:35

## 2022-01-04 RX ADMIN — IBUPROFEN 800 MG: 800 TABLET, FILM COATED ORAL at 09:41

## 2022-01-04 RX ADMIN — ACETAMINOPHEN 650 MG: 325 TABLET ORAL at 11:35

## 2022-01-04 RX ADMIN — IBUPROFEN 800 MG: 800 TABLET, FILM COATED ORAL at 17:31

## 2022-01-04 RX ADMIN — HYDROCODONE BITARTRATE AND ACETAMINOPHEN 2 TABLET: 5; 325 TABLET ORAL at 06:05

## 2022-01-04 RX ADMIN — HYDROCODONE BITARTRATE AND ACETAMINOPHEN 1 TABLET: 5; 325 TABLET ORAL at 13:59

## 2022-01-04 RX ADMIN — HYDROCODONE BITARTRATE AND ACETAMINOPHEN 1 TABLET: 5; 325 TABLET ORAL at 22:28

## 2022-01-04 ASSESSMENT — PAIN SCALES - GENERAL
PAINLEVEL_OUTOF10: 6
PAINLEVEL_OUTOF10: 5
PAINLEVEL_OUTOF10: 4
PAINLEVEL_OUTOF10: 4
PAINLEVEL_OUTOF10: 5
PAINLEVEL_OUTOF10: 6
PAINLEVEL_OUTOF10: 3
PAINLEVEL_OUTOF10: 7

## 2022-01-04 NOTE — ANESTHESIA POSTPROCEDURE EVALUATION
Department of Anesthesiology  Postprocedure Note    Patient: Caren Vail  MRN: [id number]  YOB: 1985  Date of evaluation: 1/4/2022  Time:  7:55 AM     Procedure Summary     Date: 01/03/22 Room / Location:     Anesthesia Start: 1048 Anesthesia Stop: 6833    Procedure: Labor Analgesia Diagnosis:     Scheduled Providers:  Responsible Provider: Bob Hartley MD    Anesthesia Type: epidural ASA Status: 2 - Emergent          Anesthesia Type: epidural    Adan Phase I: Adan Score: 9    Adan Phase II: Adan Score: 10    Last vitals: Reviewed and per EMR flowsheets. Anesthesia Post Evaluation    Level of consciousness: awake  Complications: no  Cardiovascular status: hemodynamically stable  Respiratory status: acceptable  Comments: No apparent complications from neuraxial anesthesia.

## 2022-01-04 NOTE — PROGRESS NOTES
Pt assisted by 2 staff members to restroom for first time get up. Pt denies dizziness or lightheadedness. Gait steady. Pt voided 900 mL urine without difficulty. Pericare done by pt with RN instruction. New ice pack, pad and panties put on pt. Gown changed. Hand hygiene done. Complete linen change done and comfort pad put on bed. Pt tolerated well.

## 2022-01-04 NOTE — L&D DELIVERY SUMMARY NOTE
[address] [address]                            LABOR AND DELIVERY NOTE    PATIENT NAME: Leigh Randle                 :        1985  MED REC NO:   [id number]                          ROOM:       9333  ACCOUNT NO:   [de-identified]                           ADMIT DATE: 2022  PROVIDER:     Jacklyn Silva MD    DATE OF PROCEDURE:  2022    DELIVERY SUMMARY    The patient is a 49-year-old  2, para 1, who presented at 41  weeks and 2 days in early labor. She had been examined in the office  and was found to be 4 cm dilated. She was having contractions every 4  to 5 minutes. She requested and received an epidural for labor  analgesia. She then had artificial rupture of membranes for clear  fluid. She progressed to complete dilatation. She had a second-stage  lasting approximately 30 minutes and had a spontaneous vaginal delivery  of a viable male infant over an intact perineum. The infant was  vigorous and crying immediately after delivery. He was placed on the  maternal abdomen. After one minute of delayed cord clamping, the cord  was doubly clamped and cut. Apgars were 8 at one minute and 9 at five  minutes and the birthweight is pending at the time of this dictation. The placenta was delivered spontaneously. It was intact and had a  three-vessel umbilical cord. Careful inspection of the cervix, vagina,  and perineum following delivery of the placenta revealed a second-degree  perineal and first-degree right labial lacerations. These were both  repaired with 3-0 Vicryl under the existing epidural anesthetic in the  usual fashion. Estimated blood loss was 100 mL. The patient and her  infant remained in the delivery room in excellent condition. She plans  to breastfeed.         Vicki Rose MD    D: 2022 18:25:40       T: 2022 21:12:20     [email or web address]  Job#: [id number]     Doc#: [id number]    CC:

## 2022-01-04 NOTE — PROGRESS NOTES
Department of Obstetrics and Gynecology  Labor and Delivery  Attending Post Partum Progress Note      SUBJECTIVE:  Pt without complaints, lochia=menses, adequate pain control    OBJECTIVE:      Vitals:  /69   Pulse 75   Temp 97.9 °F (36.6 °C) (Oral)   Resp 16   Ht 5' 3\" (1.6 m)   Wt 160 lb (72.6 kg)   SpO2 100%   Breastfeeding Unknown   BMI 28.34 kg/m²     ABDOMEN:  No scars, normal bowel sounds, soft, non-distended, non-tender, no masses palpated, no hepatosplenomegally  GENITAL/URINARY:  deferred    DATA:    CBC:    Lab Results   Component Value Date    WBC 8.5 2022    RBC 4.26 2022    HGB 13.7 2022    HCT 39.1 2022    MCV 91.8 2022    RDW 13.0 2022     2022       ASSESSMENT & PLAN:    A: PPD #1 s/p     P: cont nl pp care

## 2022-01-04 NOTE — LACTATION NOTE
This note was copied from a baby's chart. Lactation Progress Note      Data:   Initial consult during lactation rounds with multip experienced breast feeder, who recently delivered by . Infant is just finishing a good feeding after delivery and MOB reports the latch was comfortable. MOB reports she breast fed x 11 months with her first child. Action: Introduced self as [facility] on for this evening and offered much support. Breast feeding education reviewed including breast care, expected  feeding behaviors during the first 24-48 hours of life, signs of hunger/satiety, hand expression of colostrum, and how to know baby is getting enough at the breast including appropriate feedings, output and weight trends. Encouraged much STS, offering the breast exclusively, often and on demand with early signs of hunger and every 2-3 hours if baby is sleepy and without feeding cues. Encouraged much STS and hand expression when offering the breast. Educated on risks to breast feeding relationship related to using pacifiers, artificial nipples, and/or formula supplements, and encouraged exclusive breastfeeding unless medical indication were to arise. Reviewed importance of deep comfortable latch. Gave tips to achieve deep latch onto the breast. Reviewed how a good latch should look and feel, and how to break latch if shallow, pinching, or painful. Instructed that nipple should be rounded when baby releases from the breast without creasing, blanching, or redness. Instructed on inpatient support, how to contact, and lactation hours for this shift. Name and number provided on whiteboard. Encouraged to call for [facility] to assess latch and for f/u support and assistance as needed. Response: Verbalized understanding of teaching provided. Comfortable with breast feeding at this time. Will call for f//u support prn.

## 2022-01-05 VITALS
HEIGHT: 63 IN | WEIGHT: 160 LBS | HEART RATE: 77 BPM | BODY MASS INDEX: 28.35 KG/M2 | TEMPERATURE: 97.9 F | OXYGEN SATURATION: 100 % | RESPIRATION RATE: 16 BRPM | DIASTOLIC BLOOD PRESSURE: 71 MMHG | SYSTOLIC BLOOD PRESSURE: 104 MMHG

## 2022-01-05 PROCEDURE — 6370000000 HC RX 637 (ALT 250 FOR IP): Performed by: OBSTETRICS & GYNECOLOGY

## 2022-01-05 RX ORDER — IBUPROFEN 600 MG/1
800 TABLET ORAL EVERY 6 HOURS PRN
Qty: 25 TABLET | Refills: 1 | Status: SHIPPED | OUTPATIENT
Start: 2022-01-05

## 2022-01-05 RX ADMIN — ACETAMINOPHEN 650 MG: 325 TABLET ORAL at 03:39

## 2022-01-05 RX ADMIN — IBUPROFEN 800 MG: 800 TABLET, FILM COATED ORAL at 01:32

## 2022-01-05 RX ADMIN — HYDROCODONE BITARTRATE AND ACETAMINOPHEN 1 TABLET: 5; 325 TABLET ORAL at 06:54

## 2022-01-05 RX ADMIN — IBUPROFEN 800 MG: 800 TABLET, FILM COATED ORAL at 08:24

## 2022-01-05 RX ADMIN — WITCH HAZEL 40 EACH: 500 SOLUTION RECTAL; TOPICAL at 10:56

## 2022-01-05 RX ADMIN — ACETAMINOPHEN 650 MG: 325 TABLET ORAL at 11:22

## 2022-01-05 RX ADMIN — DOCUSATE SODIUM 100 MG: 100 CAPSULE ORAL at 08:24

## 2022-01-05 ASSESSMENT — PAIN SCALES - GENERAL
PAINLEVEL_OUTOF10: 3
PAINLEVEL_OUTOF10: 4
PAINLEVEL_OUTOF10: 3

## 2022-01-05 NOTE — DISCHARGE SUMMARY
Obstetrical Discharge Form    Gestational Age:39w2d    Antepartum complications: none    Date of Delivery: 1/3/22    Type of Delivery: vaginal, spontaneous    Delivered By:  XO         Baby:   Information for the patient's :  Tk, Baby Boy Tammie Krishnamurthy [[id number]]        Anesthesia: Epidural    Intrapartum complications: None    Postpartum complications: none    Condition: good    Discharge Medication:      Medication List      ASK your doctor about these medications    diphenhydrAMINE 12.5 MG/5ML liquid  Commonly known as: BENYLIN     ibuprofen 800 MG tablet  Commonly known as: ADVIL;MOTRIN  Take 1 tablet by mouth every 8 hours as needed (Pain level 1 - 10)     PRENATAL 1 PO     therapeutic multivitamin-minerals tablet             Discharge Date: 22    Plan:   Follow up in 6 week(s)

## 2022-01-05 NOTE — PLAN OF CARE
Problem: VAGINAL DELIVERY - RECOVERY AND POST PARTUM  Goal: Vital signs are medically acceptable  1/5/2022 1037 by Laure Clark RN  Outcome: Completed  1/4/2022 2113 by Ulysses Donato RN  Outcome: Ongoing  Goal: Patient will remain free of falls  1/5/2022 1037 by Laure Clark RN  Outcome: Completed  1/4/2022 2113 by Ulysses Donato RN  Outcome: Ongoing  Goal: Fundus firm at midline  1/5/2022 1037 by Laure Clark RN  Outcome: Completed  1/4/2022 2113 by Ulysses Donato RN  Outcome: Ongoing  Goal: Moderate rubra without clots, no purulent discharge, no foul smelling lochia  1/5/2022 1037 by Laure Clark RN  Outcome: Completed  1/4/2022 2113 by Ulysses Donato RN  Outcome: Ongoing  Goal: Empties bladder  1/5/2022 1037 by Laure Clark RN  Outcome: Completed  1/4/2022 2113 by Ulysses Donato RN  Outcome: Ongoing  Goal: Verbalizes understanding of normal bowel function resumption  1/5/2022 1037 by Laure Clark RN  Outcome: Completed  1/4/2022 2113 by Ulysses Donato RN  Outcome: Ongoing  Goal: Edema will be absent or minimal  1/5/2022 1037 by Laure Clark RN  Outcome: Completed  1/4/2022 2113 by Ulysses Donato RN  Outcome: Ongoing  Goal: Breasts are soft with nipple integrity intact  1/5/2022 1037 by Laure Clark RN  Outcome: Completed  1/4/2022 2113 by Ulysses Donato RN  Outcome: Ongoing  Goal: Demonstrates appropriate breast feeding techniques  1/5/2022 1037 by Laure Clark RN  Outcome: Completed  1/4/2022 2113 by Ulysses Donato RN  Outcome: Ongoing  Goal: Appropriate behavior observed  1/5/2022 1037 by Laure Clark RN  Outcome: Completed  1/4/2022 2113 by Ulysses Donato RN  Outcome: Ongoing  Goal: Positive Mother-Baby interactions are observed  1/5/2022 1037 by Laure Clark RN  Outcome: Completed  1/4/2022 2113 by Ulysses Donato RN  Outcome: Ongoing  Goal: Perineum intact without discharge or hematoma  1/5/2022 1037 by Laure Clark RN  Outcome: Completed  1/4/2022 2113 by Shari Lee RN  Outcome: Ongoing  Goal: Ambulates independently  1/5/2022 1037 by Chinedu De La O RN  Outcome: Completed  1/4/2022 2113 by Shari Lee RN  Outcome: Ongoing     Problem: PAIN  Goal: Patient's pain/discomfort is manageable  1/5/2022 1037 by Chinedu De La O RN  Outcome: Completed  1/4/2022 2113 by Shari Lee RN  Outcome: Ongoing     Problem: KNOWLEDGE DEFICIT  Goal: Patient/S.O. demonstrates understanding of disease process, treatment plan, medications, and discharge instructions.   1/5/2022 1037 by Chinedu De La O RN  Outcome: Completed  1/4/2022 2113 by Shari Lee RN  Outcome: Ongoing

## 2022-01-05 NOTE — PLAN OF CARE
Problem: VAGINAL DELIVERY - RECOVERY AND POST PARTUM  Goal: Vital signs are medically acceptable  1/4/2022 2113 by Joselyn German RN  Outcome: Ongoing  1/4/2022 0747 by Yasmin García RN  Outcome: Ongoing  Goal: Patient will remain free of falls  1/4/2022 2113 by Joselyn German RN  Outcome: Ongoing  1/4/2022 0747 by Yasmin García, RN  Outcome: Ongoing  Goal: Fundus firm at midline  1/4/2022 2113 by Joselyn German, RN  Outcome: Ongoing  1/4/2022 0747 by Yasmin García RN  Outcome: Ongoing  Goal: Moderate rubra without clots, no purulent discharge, no foul smelling lochia  1/4/2022 2113 by Joselyn German, RN  Outcome: Ongoing  1/4/2022 0747 by Yasmin García RN  Outcome: Ongoing  Goal: Empties bladder  1/4/2022 2113 by Joselyn German, RN  Outcome: Ongoing  1/4/2022 0747 by Yasmin García RN  Outcome: Ongoing  Goal: Verbalizes understanding of normal bowel function resumption  1/4/2022 2113 by Joselyn German, RN  Outcome: Ongoing  1/4/2022 0747 by Yasmin García RN  Outcome: Ongoing  Goal: Edema will be absent or minimal  1/4/2022 2113 by Joselyn German, RN  Outcome: Ongoing  1/4/2022 0747 by Yasmin García RN  Outcome: Ongoing  Goal: Breasts are soft with nipple integrity intact  1/4/2022 2113 by Joselyn German, RN  Outcome: Ongoing  1/4/2022 0747 by Yasmin García RN  Outcome: Ongoing  Goal: Demonstrates appropriate breast feeding techniques  1/4/2022 2113 by Joselyn German RN  Outcome: Ongoing  1/4/2022 0747 by Yasmin García RN  Outcome: Ongoing  Goal: Appropriate behavior observed  1/4/2022 2113 by Joselyn German RN  Outcome: Ongoing  1/4/2022 0747 by Yasmin García RN  Outcome: Ongoing  Goal: Positive Mother-Baby interactions are observed  1/4/2022 2113 by Joselyn German RN  Outcome: Ongoing  1/4/2022 0747 by Yasmin García RN  Outcome: Ongoing  Goal: Perineum intact without discharge or hematoma  1/4/2022 2113 by Joselyn German RN  Outcome: Ongoing  1/4/2022 0747 by Yasmin García RN  Outcome: Ongoing  Goal: Ambulates independently  1/4/2022 2113 by Greyson Wagner RN  Outcome: Ongoing  1/4/2022 0747 by Eileen Voss RN  Outcome: Ongoing     Problem: PAIN  Goal: Patient's pain/discomfort is manageable  1/4/2022 2113 by Greyson Wagner RN  Outcome: Ongoing  1/4/2022 0747 by Eileen Voss RN  Outcome: Ongoing     Problem: KNOWLEDGE DEFICIT  Goal: Patient/S.O. demonstrates understanding of disease process, treatment plan, medications, and discharge instructions.   1/4/2022 2113 by Greyson Wagner RN  Outcome: Ongoing  1/4/2022 0747 by Eileen Voss RN  Outcome: Ongoing

## 2022-01-05 NOTE — PROGRESS NOTES
[facility]  Labor and Delivery   Post Partum Progress Note      SUBJECTIVE:  PPD x 2    OBJECTIVE:      Vitals:  Vitals:    01/05/22 0822   BP: 104/71   Pulse: 77   Resp: 16   Temp: 97.9 °F (36.6 °C)   SpO2:         Fundus firm, normal lochia  Extremities normal      DATA:    CBC:    Lab Results   Component Value Date    WBC 10.1 01/04/2022    RBC 3.91 01/04/2022    HGB 12.4 01/04/2022    HCT 36.5 01/04/2022    MCV 93.1 01/04/2022    RDW 13.2 01/04/2022     01/04/2022       ASSESSMENT & PLAN:      Doing well  D/c home  RTO 6 wks    ELIZABETH Corona

## 2022-01-05 NOTE — FLOWSHEET NOTE
Lactation Progress Note      Data:    Introduced myself as Lactation support for the evening. Mom was in bed with nurses at bedside. Baby was asleep on her lap. Action:   No questions at this time. Mob states baby is feeding well and doing well. Response: Name and number is on board.

## 2022-01-05 NOTE — PROGRESS NOTES
Discharge Phone Call Log  Patient Name: Shlomo Hodgkins     [facility] Care Provider: Trini Del Valle MD Discharge Date: 2022    Disposition of baby:    Phone Number: [phone] (home)     Attempts to Contact:  Date:    Nurse  Date:    Nurse  Date:    Nurse    1. Now that you are at home is your pain being well controlled? Y/N   What pain reducing measures are you using? ____________________________________        Information for the patient's :  Ora Alegria [[id number]]   Delivery Method: Vaginal, Spontaneous     2. Are you currently  having any infant feeding issues? Y/N _____________________________ If yes, please explain: __________________________________________________________________  3. If breastfeeding, were you satisfied with the breastfeeding support services offered? Y/N  4.  Have you had to supplement? Y/N If yes, please explain: _____________________________________________________       Did you supplement while in the hospital, or begin formula supplementation at home?________________________________________  5. Did your OB provider offer you information about the benefits of breastfeeding during your prenatal visits? Y/N  6.  Have you made or have you already had your first appointment with the baby's doctor? Y/N If no, do you know when to schedule it? Y/N   7.  Have you scheduled your follow-up appointment? Y/N  If no, do you know when to schedule it? Y/N  8. Did staff discuss safe sleep during your stay? Y/N  Did you see the wall cling posted in your room explaining how to keep you and your baby safe? Y/N  10. Did your nurses and physicians include you in the plan of care, communicating with you respectfully? Y/N If no, please explain __________________________  11. Is there anyone in particular you would like to mention who provided care for you? ________________________________  12. Did your discharge occur in a timely manner?   Y/N If no, please explain __________________________  13. Do you have any other questions or concerns I can address today?  Y/N  __________________________________________________      Teaching During interview :_____________________________________________  ___________________________RN       Date:______________Time:________________